# Patient Record
Sex: FEMALE | Race: WHITE | NOT HISPANIC OR LATINO | Employment: STUDENT | URBAN - METROPOLITAN AREA
[De-identification: names, ages, dates, MRNs, and addresses within clinical notes are randomized per-mention and may not be internally consistent; named-entity substitution may affect disease eponyms.]

---
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
## 2021-11-01 ENCOUNTER — TELEPHONE (OUTPATIENT)
Dept: OBGYN CLINIC | Facility: CLINIC | Age: 21
End: 2021-11-01

## 2021-11-01 DIAGNOSIS — Z30.41 ENCOUNTER FOR SURVEILLANCE OF CONTRACEPTIVE PILLS: ICD-10-CM

## 2021-11-01 RX ORDER — DROSPIRENONE AND ETHINYL ESTRADIOL 0.02-3(28)
1 KIT ORAL DAILY
Qty: 84 TABLET | Refills: 0 | Status: SHIPPED | OUTPATIENT
Start: 2021-11-01 | End: 2022-01-17

## 2022-01-19 NOTE — ASSESSMENT & PLAN NOTE
Normal findings on routine annual gyn exam  Discussed adolescent breast health recommendations, breast awareness and self exam  Reviewed ASCCP guidelines and advised baseline pap at age 24  The patient reports she is unsure if she has completed Gardasil series  Written information provided  STI testing was offered and declined at this time; the patient is aware that condoms are recommended for all sexual contact for prevention of STI and she may seek testing at any time  Reviewed diet/activity recommendations and reasons to call   F/u as needed or in one year for routine annual gyn exam

## 2022-01-20 ENCOUNTER — ANNUAL EXAM (OUTPATIENT)
Dept: OBGYN CLINIC | Facility: CLINIC | Age: 22
End: 2022-01-20
Payer: COMMERCIAL

## 2022-01-20 VITALS
DIASTOLIC BLOOD PRESSURE: 72 MMHG | WEIGHT: 169.2 LBS | HEIGHT: 71 IN | SYSTOLIC BLOOD PRESSURE: 102 MMHG | BODY MASS INDEX: 23.69 KG/M2

## 2022-01-20 DIAGNOSIS — Z01.419 ENCOUNTER FOR GYNECOLOGICAL EXAMINATION (GENERAL) (ROUTINE) WITHOUT ABNORMAL FINDINGS: Primary | ICD-10-CM

## 2022-01-20 DIAGNOSIS — Z30.09 ENCOUNTER FOR OTHER GENERAL COUNSELING OR ADVICE ON CONTRACEPTION: ICD-10-CM

## 2022-01-20 PROBLEM — Z30.41 ENCOUNTER FOR SURVEILLANCE OF CONTRACEPTIVE PILLS: Status: RESOLVED | Noted: 2019-04-11 | Resolved: 2022-01-20

## 2022-01-20 PROBLEM — Z30.9 CONTRACEPTIVE MANAGEMENT: Status: ACTIVE | Noted: 2022-01-20

## 2022-01-20 PROCEDURE — S0612 ANNUAL GYNECOLOGICAL EXAMINA: HCPCS | Performed by: NURSE PRACTITIONER

## 2022-01-20 NOTE — PROGRESS NOTES
Encounter for gynecological examination (general) (routine) without abnormal findings    Normal findings on routine annual gyn exam  Discussed adolescent breast health recommendations, breast awareness and self exam  Reviewed ASCCP guidelines and advised baseline pap at age 24  The patient reports she is unsure if she has completed Gardasil series  Written information provided  STI testing was offered and declined at this time; the patient is aware that condoms are recommended for all sexual contact for prevention of STI and she may seek testing at any time  Reviewed diet/activity recommendations and reasons to call  F/u as needed or in one year for routine annual gyn exam      Contraceptive management  Stopped OCP a month ago due to decreased libido  This patient presents for contraceptive counseling  We discussed all options at length including barrier methods, combination estrogen progesterone methods (pill, patch and ring), progesterone only methods (pill, Depo, Nexplanon and IUD) and non-hormonal methods (paragard, NFP, sterilization)  We reviewed directions for use, Ses/AEs, risks and benefits  All questions were answered  She is aware that condoms will be advised with all sexual contact for prevention of STI  The patient's personal and FH were reviewed and she is without risk factors for VTE  Written information provided and she will call if interested in starting any method  Diagnoses and all orders for this visit:    Encounter for gynecological examination (general) (routine) without abnormal findings    Encounter for other general counseling or advice on contraception         [facility]  [facility]      CC:  Yearly exam    S: Dorian Troncoso is a 22 y o  female here for yearly exam  Her cycles are regular, not heavy or painful  She stopped OCP a month ago due to decreased libido  Wants to see how she feels off BC  Discussed lubricants and information given on [employer] for sexual health       Sexual activity: She is sexually active and uses condoms for contraception  STD testing: She does not want STD testing today  Gardasil: She is unsure if she has completed the Gardasil series  Strongly encouraged to get series  SBE-sometimes-  No family hx of breast cancer    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[place] [address]     Current Outpatient Medications:     Joe Tisha 3-0 02 MG per tablet, TAKE 1 TABLET BY Mary 57 (Patient not taking: Reported on 1/20/2022), Disp: 84 tablet, Rfl: 0    VITAMIN C, CALCIUM ASCORBATE, PO, Take by mouth (Patient not taking: Reported on 1/20/2022 ), Disp: , Rfl:     Zinc Acetate, Oral, (ZINC ACETATE PO), Take by mouth (Patient not taking: Reported on 1/20/2022 ), Disp: , Rfl:   Social History     Socioeconomic History    Marital status: Single     Spouse name: Not on file    Number of children: Not on file    Years of education: high school student    Highest education level: Not on file   Occupational History    Not on file   Tobacco Use    Smoking status: Never Smoker    Smokeless tobacco: Never Used   Substance and Sexual Activity    Alcohol use: No    Drug use: No    Sexual activity: Yes     Partners: Male     Birth control/protection: Condom Male   Other Topics Concern    Not on file   Social History Narrative    Not on file     Social Determinants of Health     Financial Resource Strain: Not on file   Food Insecurity: Not on file   Transportation Needs: Not on file   Physical Activity: Not on file   Stress: Not on file   Social Connections: Not on file   Intimate Partner Violence: Not on file   Housing Stability: Not on file     Family History   Problem Relation Age of Onset    Stroke Mother     Hypertension Mother     Hypertension Father     Stroke Maternal Grandmother      History reviewed  No pertinent past medical history  Review of Systems   Constitutional: Negative for appetite change, fatigue and unexpected weight change  Respiratory: Negative for shortness of breath  Cardiovascular: Negative for chest pain and leg swelling  Breasts:  negative for tenderness or masses  Gastrointestinal: Negative for abdominal pain  Endocrine: Negative for cold intolerance and heat intolerance  Genitourinary: Negative for dyspareunia, dysuria, flank pain, frequency, genital sores, hematuria, menstrual problem and pelvic pain  + for decreased libido  Musculoskeletal: Negative for back pain  Neurological: Negative for headaches  O:  Blood pressure 102/72, height 5' 10 75" (1 797 m), weight 76 7 kg (169 lb 3 2 oz), last menstrual period 01/02/2022, not currently breastfeeding  Patient appears well and is not in distress  ROM normal   Neck is supple without masses  Normal thyroid  Heart regular rate and rhythm  Capillary refill < 2 seconds   Lungs CTA bilaterally   Breasts are symmetrical without mass, tenderness, nipple discharge, skin changes or adenopathy  Abdomen is soft and nontender without masses     Skin warm and dry  Affect normal   Alert and oriented x 3

## 2022-01-20 NOTE — ASSESSMENT & PLAN NOTE
Stopped OCP a month ago due to decreased libido  This patient presents for contraceptive counseling  We discussed all options at length including barrier methods, combination estrogen progesterone methods (pill, patch and ring), progesterone only methods (pill, Depo, Nexplanon and IUD) and non-hormonal methods (paragard, NFP, sterilization)  We reviewed directions for use, Ses/AEs, risks and benefits  All questions were answered  She is aware that condoms will be advised with all sexual contact for prevention of STI  The patient's personal and FH were reviewed and she is without risk factors for VTE  Written information provided and she will call if interested in starting any method

## 2022-04-04 DIAGNOSIS — Z30.41 ENCOUNTER FOR SURVEILLANCE OF CONTRACEPTIVE PILLS: ICD-10-CM

## 2022-05-25 ENCOUNTER — TELEPHONE (OUTPATIENT)
Dept: OBGYN CLINIC | Facility: CLINIC | Age: 22
End: 2022-05-25

## 2023-08-15 NOTE — PATIENT INSTRUCTIONS
Breast Self Exam for Women   AMBULATORY CARE:   A breast self-exam (BSE)  is a way to check your breasts for lumps and other changes. Regular BSEs can help you know how your breasts normally look and feel. Most breast lumps or changes are not cancer, but you should always have them checked by a healthcare provider. Why you should do a BSE:  Breast cancer is the most common type of cancer in women. Even if you have mammograms, you may still want to do a BSE regularly. If you know how your breasts normally feel and look, it may help you know when to contact your healthcare provider. Mammograms can miss some cancers. You may find a lump during a BSE that did not show up on a mammogram.  When you should do a BSE:  If you have periods, you may want to do your BSE 1 week after your period ends. This is the time when your breasts may be the least swollen, lumpy, or tender. You can do regular BSEs even if you are breastfeeding or have breast implants. Call your doctor if:   You find any lumps or changes in your breasts. You have breast pain or fluid coming from your nipples. You have questions or concerns about your condition or care. How to do a BSE:       Look at your breasts in a mirror. Look at the size and shape of each breast and nipple. Check for swelling, lumps, dimpling, scaly skin, or other skin changes. Look for nipple changes, such as a nipple that is painful or beginning to pull inward. Gently squeeze both nipples and check to see if fluid (that is not breast milk) comes out of them. If you find any of these or other breast changes, contact your healthcare provider. Check your breasts while you sit or  the following 3 positions:    [place] your arms down at your sides. Raise your hands and join them behind your head. Put firm pressure with your hands on your hips. Bend slightly forward while you look at your breasts in the mirror. Lie down and feel your breasts.   When you lie down, your breast tissue spreads out evenly over your chest. This makes it easier for you to feel for lumps and anything that may not be normal for your breasts. Do a BSE on one breast at a time. Place a small pillow or towel under your left shoulder. Put your left arm behind your head. Use the 3 middle fingers of your right hand. Use your fingertip pads, on the top of your fingers. Your fingertip pad is the most sensitive part of your finger. Use small circles to feel your breast tissue. Use your fingertip pads to make dime-sized, overlapping circles on your breast and armpits. Use light, medium, and firm pressure. First, press lightly. Second, press with medium pressure to feel a little deeper into the breast. Last, use firm pressure to feel deep within your breast.    Examine your entire breast area. Examine the breast area from above the breast to below the breast where you feel only ribs. Make small circles with your fingertips, starting in the middle of your armpit. Make circles going up and down the breast area. Continue toward your breast and all the way across it. Examine the area from your armpit all the way over to the middle of your chest (breastbone). Stop at the middle of your chest.    Move the pillow or towel to your right shoulder, and put your right arm behind your head. Use the 3 fingertip pads of your left hand, and repeat the above steps to do a BSE on your right breast.  What else you can do to check for breast problems or cancer:  Talk to your healthcare provider about mammograms. A mammogram is an x-ray of your breasts to screen for breast cancer or other problems. Your provider can tell you the benefits and risks of mammograms. The first mammogram is usually at age 39 or 48. Your provider may recommend you start at 36 or younger if your risk for breast cancer is high. Mammograms usually continue every 1 to 2 years until age 76.        Follow up with your doctor as directed:  Write down your questions so you remember to ask them during your visits. © Copyright Hamzah Pema 2022 Information is for End User's use only and may not be sold, redistributed or otherwise used for commercial purposes. The above information is an  only. It is not intended as medical advice for individual conditions or treatments. Talk to your doctor, nurse or pharmacist before following any medical regimen to see if it is safe and effective for you. Wellness Visit for Adults   AMBULATORY CARE:   A wellness visit  is when you see your healthcare provider to get screened for health problems. Your healthcare provider will also give you advice on how to stay healthy. Write down your questions so you remember to ask them. Ask your healthcare provider how often you should have a wellness visit. What happens at a wellness visit:  Your healthcare provider will ask about your health, and your family history of health problems. This includes high blood pressure, heart disease, and cancer. He or she will ask if you have symptoms that concern you, if you smoke, and about your mood. You may also be asked about your intake of medicines, supplements, food, and alcohol. Any of the following may be done: Your weight  will be checked. Your height may also be checked so your body mass index (BMI) can be calculated. Your BMI shows if you are at a healthy weight. Your blood pressure  and heart rate will be checked. Your temperature may also be checked. Blood and urine tests  may be done. Blood tests may be done to check your cholesterol levels. Abnormal cholesterol levels increase your risk for heart disease and stroke. You may also need a blood or urine test to check for diabetes if you are at increased risk. Urine tests may be done to look for signs of an infection or kidney disease. A physical exam  includes checking your heartbeat and lungs with a stethoscope.  Your healthcare provider may also check your skin to look for sun damage. Screening tests  may be recommended. A screening test is done to check for diseases that may not cause symptoms. The screening tests you may need depend on your age, gender, family history, and lifestyle habits. For example, colorectal screening may be recommended if you are 48years old or older. Screening tests you need if you are a woman:   A Pap smear  is used to screen for cervical cancer. Pap smears are usually done every 3 to 5 years depending on your age. You may need them more often if you have had abnormal Pap smear test results in the past. Ask your healthcare provider how often you should have a Pap smear. A mammogram  is an x-ray of your breasts to screen for breast cancer. Experts recommend mammograms every 2 years starting at age 48 years. You may need a mammogram at age 52 years or younger if you have an increased risk for breast cancer. Talk to your healthcare provider about when you should start having mammograms and how often you need them. Vaccines you may need:   Get an influenza vaccine  every year. The influenza vaccine protects you from the flu. Several types of viruses cause the flu. The viruses change over time, so new vaccines are made each year. Get a tetanus-diphtheria (Td) booster vaccine  every 10 years. This vaccine protects you against tetanus and diphtheria. Tetanus is a severe infection that may cause painful muscle spasms and lockjaw. Diphtheria is a severe bacterial infection that causes a thick covering in the back of your mouth and throat. Get a human papillomavirus (HPV) vaccine  if you are female and aged 23 to 32 or male 23 to 24 and never received it. This vaccine protects you from HPV infection. HPV is the most common infection spread by sexual contact. HPV may also cause vaginal, penile, and anal cancers. Get a pneumococcal vaccine  if you are aged 72 years or older.  The pneumococcal vaccine is an injection given to protect you from pneumococcal disease. Pneumococcal disease is an infection caused by pneumococcal bacteria. The infection may cause pneumonia, meningitis, or an ear infection. Get a shingles vaccine  if you are 60 or older, even if you have had shingles before. The shingles vaccine is an injection to protect you from the varicella-zoster virus. This is the same virus that causes chickenpox. Shingles is a painful rash that develops in people who had chickenpox or have been exposed to the virus. How to eat healthy:  My Plate is a model for planning healthy meals. It shows the types and amounts of foods that should go on your plate. Fruits and vegetables make up about half of your plate, and grains and protein make up the other half. A serving of dairy is included on the side of your plate. The amount of calories and serving sizes you need depends on your age, gender, weight, and height. Examples of healthy foods are listed below:  Eat a variety of vegetables  such as dark green, red, and orange vegetables. You can also include canned vegetables low in sodium (salt) and frozen vegetables without added butter or sauces. Eat a variety of fresh fruits , canned fruit in 100% juice, frozen fruit, and dried fruit. Include whole grains. At least half of the grains you eat should be whole grains. Examples include whole-wheat bread, wheat pasta, brown rice, and whole-grain cereals such as oatmeal.    Eat a variety of protein foods such as seafood (fish and shellfish), lean meat, and poultry without skin (turkey and chicken). Examples of lean meats include pork leg, shoulder, or tenderloin, and beef round, sirloin, tenderloin, and extra lean ground beef. Other protein foods include eggs and egg substitutes, beans, peas, soy products, nuts, and seeds. Choose low-fat dairy products such as skim or 1% milk or low-fat yogurt, cheese, and cottage cheese. Limit unhealthy fats  such as butter, hard margarine, and shortening. Exercise:  Exercise at least 30 minutes per day on most days of the week. Some examples of exercise include walking, biking, dancing, and swimming. You can also fit in more physical activity by taking the stairs instead of the elevator or parking farther away from stores. Include muscle strengthening activities 2 days each week. Regular exercise provides many health benefits. It helps you manage your weight, and decreases your risk for type 2 diabetes, heart disease, stroke, and high blood pressure. Exercise can also help improve your mood. Ask your healthcare provider about the best exercise plan for you. General health and safety guidelines:   Do not smoke. Nicotine and other chemicals in cigarettes and cigars can cause lung damage. Ask your healthcare provider for information if you currently smoke and need help to quit. E-cigarettes or smokeless tobacco still contain nicotine. Talk to your healthcare provider before you use these products. Limit alcohol. A drink of alcohol is 12 ounces of beer, 5 ounces of wine, or 1½ ounces of liquor. Lose weight, if needed. Being overweight increases your risk of certain health conditions. These include heart disease, high blood pressure, type 2 diabetes, and certain types of cancer. Protect your skin. Do not sunbathe or use tanning beds. Use sunscreen with a SPF 15 or higher. Apply sunscreen at least 15 minutes before you go outside. Reapply sunscreen every 2 hours. Wear protective clothing, hats, and sunglasses when you are outside. Drive safely. Always wear your seatbelt. Make sure everyone in your car wears a seatbelt. A seatbelt can save your life if you are in an accident. Do not use your cell phone when you are driving. This could distract you and cause an accident. Pull over if you need to make a call or send a text message. Practice safe sex. Use latex condoms if are sexually active and have more than one partner.  Your healthcare provider may recommend screening tests for sexually transmitted infections (STIs). Wear helmets, lifejackets, and protective gear. Always wear a helmet when you ride a bike or motorcycle, go skiing, or play sports that could cause a head injury. Wear protective equipment when you play sports. Wear a lifejacket when you are on a boat or doing water sports. © Copyright [facility] 2022 Information is for End User's use only and may not be sold, redistributed or otherwise used for commercial purposes. The above information is an  only. It is not intended as medical advice for individual conditions or treatments. Talk to your doctor, nurse or pharmacist before following any medical regimen to see if it is safe and effective for you. Safe Sex Practices   AMBULATORY CARE:   Safe sex practices  are ways to prevent pregnancy and the spread of sexually transmitted infections (STIs). An STI happens when a virus or bacteria are spread through sexual activity. Safe sex practices help decrease or prevent body fluid exchange during sex. Body fluids include saliva, urine, blood, vaginal fluids, and semen. Oral, vaginal, and anal sex can all spread STIs. Seek care immediately if:   A condom breaks, leaks, or slips off while you are having sex. You notice sores on your penis, vagina, anal area, or skin around them. You have had unsafe sex and want to discuss emergency contraception or treatment for STI exposure. Call your doctor if:   You or your female sex partner might be pregnant. You have questions or concerns about your condition or care. Safe sex practices to follow before you have sex:   Talk to a new partner before you have sex. Tell your partner if you have an STI. Ask about his or her sex history and if he or she has a current or past STI. Your partner may need to be tested and treated.  Do not have sex while you are being treated for an STI, or with a partner who is being treated. Limit your number of sex partners. More than one sex partner can increase your risk for an STI. Do not have sex with anyone whose sex history you do not know. Get tested for STIs if needed. Get tested if you had sex with someone who has an STI. Get tested if you have unprotected sex with any new partner. Talk to your healthcare provider about birth control. Birth control can help prevent an unwanted pregnancy. There are many different types of birth control. Talk to your healthcare provider about which birth control method is right for you. Ask about medicines to lower your risk for some STIs:      Vaccines  can help protect you from hepatitis A, hepatitis B, and the human papillomavirus (HPV). The HPV vaccine is usually given at 11 years, but it may be given through 26 years to both females and males. Your provider can give you more information on vaccines to prevent STIs. Pre-exposure prophylaxis (PrEP)  may be given if you are at high risk for HIV. PrEP is taken every day to prevent the virus from fully infecting the body. Do not use alcohol or drugs before sex. These can prevent you from thinking clearly and increase your risk for unsafe sex. Safe sex practices to follow while you are having sex:   Use condoms and barrier methods for all types of sexual contact. This includes oral, vaginal, and anal sex. Male and female condoms are available. Make sure that the condom fits and is put on correctly. Rubber latex sheets or dental dams can be used for oral sex. Use a new condom or latex barrier each time you have sex. Use latex condoms, if possible. [place] or natural condoms do not prevent STIs. If you or your partner is allergic to latex, use a nonlatex product, such as polyurethane. Use a second form of birth control with the condom to prevent pregnancy and STIs. Do not use male and female condoms together. Only use water-based lubricants during sex.   Water-based lubricants help prevent sores or cuts in the vagina or on the penis. Prevent sores or cuts to decrease your risk for an STI. Do not use oil-based lubricants, such as baby oil or hand lotion, with latex condoms or barriers. These will weaken the latex and may cause the condom to break. Do not use chemicals that irritate your skin. Products that contain chemical irritants, such as spermicides, can irritate the lining of your vagina or rectum. Irritation may cause sores that can increase your risk for an STI. Be careful when you have sex if you have open sores or cuts. Open sores or cuts may increase your risk for an STI. Keep all open sores or cuts covered during sex. Do not have oral sex if you have cuts or sores in your mouth. Do not do activities that can pass germs. Do not use saliva as a lubricant or share sex toys. Follow up with your doctor as directed:  Write down your questions so you remember to ask them during your visits. © Copyright Guilherme Hansen 2022 Information is for End User's use only and may not be sold, redistributed or otherwise used for commercial purposes. The above information is an  only. It is not intended as medical advice for individual conditions or treatments. Talk to your doctor, nurse or pharmacist before following any medical regimen to see if it is safe and effective for you. HPV (Human Papillomavirus)   AMBULATORY CARE:   Human Papillomavirus (HPV)  is the name for a group of viruses that can infect your skin or other parts of your body. HPV is the most common infection spread by sexual contact. It can also be spread from a mother to her baby during delivery. Common symptoms include the following:   Painless warts in your mouth or on your genitals    Genital or anal discharge, bleeding, itching, or pain    Pain when you urinate    Call your doctor if:   You have new or worsening symptoms. You have questions or concerns about your condition or care.     HPV diagnosis:  Your healthcare provider may use a vinegar liquid to help diagnose HPV genital warts. Women 27to 72years old can be checked for HPV during regular cervical cancer screenings. An HPV test checks for certain types of HPV that can cause changes in cervical cells. Without treatment, the changed cells can become cancer. An HPV test can be done every 5 years if the results show no infection. The test can be done with or without a Pap smear. A Pap smear checks for cancer or for abnormal cells that can become cancer. You may be tested for HPV if you have mouth or throat cancer. Treatment:  HPV cannot be cured, but an infection may go away on its own in about 2 years without causing problems. If the infection continues, some types of HPV can lead to health conditions that need to be treated. Examples include warts and certain cancers, especially squamous cell carcinoma (SCC). HPV-linked SCCs commonly develop in the anus, throat (called oropharyngeal cancer), cervix, vagina, penis, or mouth. HPV can also cause a type of cervical cancer called adenocarcinoma. Symptoms of any of these conditions may not develop for several years after you were exposed to HPV. You will need to be monitored closely. Ask your healthcare provider for more information about monitoring, conditions caused by HPV, and available treatments. Prevent an HPV infection:  HPV is usually spread through sexual activity. The following can help prevent infection:  Ask about the HPV vaccine. The HPV vaccine is given to females and males, usually at 6or 15years of age. It can be given from 9 years through 39years of age, if needed. It is most effective if given before sexual activity begins. Use a new condom, contraceptive barrier, or dental dam each time you have sex. This includes oral, vaginal, and anal sex. Talk to your healthcare provider if you have any questions about what to use or how to use it.     Follow up with your doctor as directed:  Write down your questions so you remember to ask them during your visits. © Copyright Malorie Cheng 2022 Information is for End User's use only and may not be sold, redistributed or otherwise used for commercial purposes. The above information is an  only. It is not intended as medical advice for individual conditions or treatments. Talk to your doctor, nurse or pharmacist before following any medical regimen to see if it is safe and effective for you. Perineal Hygiene      Your vaginal naturally takes care of its self, it is a self washing system, the less you mess the healthier it will be     No soaps or feminine wash to the vulva, these products can cause dermitis, bacterial infections and other vulvar problems. Use only water to cleanse, or water with Dove or [employer] if necessary. No scented lotions or products are advised in or near your vulva. Use only coconut oil for moisture if needed. No douching this may cause imbalance in your vaginal PH and further issues. If you wear panty liners, you may apply a thin coating of Vaseline, A&D ointment or coconut oil to the vulvar tissues as a skin barrier     [place] underware, loose fitting clothing  Only perfume-free, dye-free laundry detergent, use a second rinse cycle   Avoid fabric softeners/dryer sheets. Partner should avoid the same products as well. Over the counter probiotic to restore vaginal shannon may be helpful as well, take daily. You may also look into Boric Acid vaginal suppositories to restore vaginal PH balance for up to 2 weeks as directed on the box. You may not use these if you are pregnant      For vaginal dryness: You may use:     Coconut oil (organic, pure, unscented) as needed for moisture or lubrication.  ( Do not use if allergic)       Replens moisture restore external comfort gel daily ( use as directed on the box)        Replens long lasting vaginal moisturizer  ( use as directed on the box)         For Vaginal Lubrication:          You may use:     Coconut oil (organic, pure, unscented) as a lubricant or another scent-free lubricant (Astroglide, Uberlube) if needed. Do not use coconut oil or silicone if using a condom as this may break down the integrity of the condom and cause an unplanned pregnancy              Do not use coconut oil if allergic               Replens silky smooth lubricant, premium silicone based lubricant for intercourse. ( use as directed, a small amount will provide an enhanced natural feeling)     Any premium over the counter vaginal lubricant water or silicone based. Silicone based will have more staying power.

## 2023-08-15 NOTE — PROGRESS NOTES
Diagnoses and all orders for this visit:    Encounter for gynecological examination without abnormal finding  -     Liquid-based pap, screening        Perineal hygiene reviewed   Weight bearing exercises minium of 150 mins/weekly advised. Kegel exercises recommended. SBE encouraged, ASCCP guidelines reviewed. Condoms encouraged with all sexual activity to prevent STI's. Gardisil vaccines recommended up to age 39  Calcium/ Vit D dietary requirements discussed,   Advised to call with any issues,  all concerns & questions addressed. See provided information in your after visit summary     F/U Annually and PRN      Health Maintenance:    Last PAP: first Pap today       Gardisil:  Not completed   Gardasil 9 was advised for prevention of HPV-related disease. We discussed risks/benefits, SE's/AE's at length and all questions were answered. Written info was provided for review. The patient agrees to consider and is aware she may call to schedule injection #1 at any time. Subjective    CC: Yearly Exam      Ashleigh Mahajan is a 26 y.o. female here for an annual exam. [facility] [place]  GYN hx includes: None  No personal Hx of breast, cervical, ovarian or colon CA. Family hx of:  No GYN cancers  Medically stable, reports no changes in medical Hx, follows with PMD    Patient's last menstrual period was 08/01/2023 (exact date). Her menstrual cycles are regular every 28-30 days. She denies issues with bleeding during her menses. She denies breast concerns, abnormal vaginal discharge, vaginal itching, odor, irritation, bowel/bladder dysfunction, urinary symptoms, pelvic pain, or dyspareunia today. She is sexually active. Monogamous relationship. Her current method of contraception includes condoms. Denies any issues with her BCM. She does not want STD testing today. 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 No pertinent past medical history.   Past Surgical History: Procedure Laterality Date   • ABSCESS DRAINAGE Right     Leg   • WISDOM TOOTH EXTRACTION           There is no immunization history on file for this patient. Family History   Problem Relation Age of Onset   • Stroke Mother    • Hypertension Mother    • Hypertension Father    • Stroke Maternal Grandmother      Social History     Tobacco Use   • Smoking status: Never   • Smokeless tobacco: Never   Vaping Use   • Vaping Use: Never used   Substance Use Topics   • Alcohol use: No   • Drug use: No     No current outpatient medications on file. Patient Active Problem List    Diagnosis Date Noted   • Contraceptive management 2022   • Dysmenorrhea 2017   • Menorrhagia with regular cycle 2017       No Known Allergies    OB History    Para Term  AB Living   0 0 0 0 0 0   SAB IAB Ectopic Multiple Live Births   0 0 0 0 0       Vitals:    23 0856   BP: 114/70   BP Location: Left arm   Patient Position: Sitting   Cuff Size: Large   Weight: 74.8 kg (165 lb)   Height: 5' 10" (1.778 m)     Body mass index is 23.68 kg/m². Review of Systems     Constitutional: Negative for chills, fatigue, fever, headaches, visual disturbances, and unexpected weight change. Respiratory: Negative for cough, & shortness of breath. Cardiovascular: Negative for chest pain. .    Gastrointestinal: Negative for Abd pain, nausea & vomiting, constipation and diarrhea. Genitourinary: Negative for difficulty urinating, dysuria, hematuria, dyspareunia, unusual vaginal bleeding or discharge  Skin: Negative skin changes    Physical Exam     Constitutional: Alert & Oriented x3, well-developed and well-nourished. No distress. HENT: Atraumatic, Normocephalic, Conjunctivae clear  Neck: Normal range of motion. Neck supple. No thyromegaly, mass, nodules or tenderness  Pulmonary: Effort normal.   Abdominal: Soft.  No tenderness or masses  Musculoskeletal: Normal ROM  Skin: Warm & Dry  Psychological: Normal mood, thought content, behavior & judgement     Breasts:   Right: tissue soft without masses, tenderness, skin changes or nipple discharge. No areas of erythema or pain. No subclavicular, axillary, pectoral adenopathy  Left:  tissue soft without masses, tenderness, skin changes or nipple discharge. No areas of erythema or pain. No subclavicular, axillary, pectoral adenopathy    Pelvic exam was performed with patient supine, lithotomy position. Labia: Negative rash, tenderness, lesion or injury on the right labia. Negative rash, tenderness, lesion or injury on the left labia. Urethral meatus:  Negative for  tenderness, inflammation or discharge. Uterus: not deviated, enlarged, fixed or tender. Cervix: No CMT, no discharge or friability. Right adnexa: no mass, no tenderness and no fullness. Left adnexa: no mass, no tenderness and no fullness. Vagina: No erythema, tenderness, masses, or foreign body in the vagina. No signs of injury around the vagina. No unusual vaginal discharge   Perineum without lesions, signs of injury, erythema or swelling. Inguinal Canal:        Right: No inguinal adenopathy or hernia present. Left: No inguinal adenopathy or hernia present.

## 2023-08-16 NOTE — PROGRESS NOTES
LMP: 2023  PMB:  SA:  YES   HPV: No Patient is not interested is getting HPV vaccine . Birth control:  Condoms    Last pap: Not on file  Last mammo: Not on file:  Last colonoscopy: Not on file  Last Dexa: Not on file  Family History:  No GYN cancer on the family history . Have Your Spot(S) Been Treated In The Past?: has not been treated Hpi Title: Evaluation of a Skin Lesion Additional History: Pt states “I have a little skin tag that I would like looked at”. Pt noticed couple of months ago and complains of irritation.

## 2023-08-17 ENCOUNTER — ANNUAL EXAM (OUTPATIENT)
Dept: OBGYN CLINIC | Facility: CLINIC | Age: 23
End: 2023-08-17
Payer: COMMERCIAL

## 2023-08-17 VITALS
HEIGHT: 70 IN | WEIGHT: 165 LBS | DIASTOLIC BLOOD PRESSURE: 70 MMHG | BODY MASS INDEX: 23.62 KG/M2 | SYSTOLIC BLOOD PRESSURE: 114 MMHG

## 2023-08-17 DIAGNOSIS — Z01.419 ENCOUNTER FOR GYNECOLOGICAL EXAMINATION WITHOUT ABNORMAL FINDING: Primary | ICD-10-CM

## 2023-08-17 PROCEDURE — S0612 ANNUAL GYNECOLOGICAL EXAMINA: HCPCS | Performed by: OBSTETRICS & GYNECOLOGY

## 2023-08-17 PROCEDURE — G0145 SCR C/V CYTO,THINLAYER,RESCR: HCPCS | Performed by: OBSTETRICS & GYNECOLOGY

## 2023-08-24 LAB
LAB AP GYN PRIMARY INTERPRETATION: NORMAL
Lab: NORMAL

## 2023-08-30 ENCOUNTER — TELEPHONE (OUTPATIENT)
Dept: OBGYN CLINIC | Facility: CLINIC | Age: 23
End: 2023-08-30

## 2023-08-30 NOTE — TELEPHONE ENCOUNTER
Left message to patient on the number on file for her message was left to inform her that her Pap smear was negative on 8/30/2023 @ 8:51 am

## 2023-08-30 NOTE — TELEPHONE ENCOUNTER
----- Message from [address] [place] [address] [address] sent at 8/25/2023  7:53 AM EDT -----  Please call with normal